# Patient Record
Sex: MALE | Race: AMERICAN INDIAN OR ALASKA NATIVE
[De-identification: names, ages, dates, MRNs, and addresses within clinical notes are randomized per-mention and may not be internally consistent; named-entity substitution may affect disease eponyms.]

---

## 2017-10-14 ENCOUNTER — HOSPITAL ENCOUNTER (EMERGENCY)
Dept: HOSPITAL 42 - ED | Age: 27
LOS: 1 days | Discharge: HOME | End: 2017-10-15
Payer: COMMERCIAL

## 2017-10-14 VITALS — TEMPERATURE: 99.9 F

## 2017-10-14 DIAGNOSIS — K52.9: Primary | ICD-10-CM

## 2017-10-14 DIAGNOSIS — Z87.891: ICD-10-CM

## 2017-10-14 DIAGNOSIS — J11.1: ICD-10-CM

## 2017-10-14 LAB
ALBUMIN/GLOB SERPL: 1.2 {RATIO} (ref 1.1–1.8)
ALP SERPL-CCNC: 57 U/L (ref 38–126)
ALT SERPL-CCNC: 22 U/L (ref 7–56)
APPEARANCE UR: (no result)
AST SERPL-CCNC: 21 U/L (ref 17–59)
BACTERIA #/AREA URNS HPF: (no result) /[HPF]
BASE EXCESS BLDV CALC-SCNC: 0.3 MMOL/L (ref 0–2)
BASOPHILS # BLD AUTO: 0.01 K/MM3 (ref 0–2)
BASOPHILS NFR BLD: 0.2 % (ref 0–3)
BILIRUB SERPL-MCNC: 0.5 MG/DL (ref 0.2–1.3)
BILIRUB UR-MCNC: NEGATIVE MG/DL
BUN SERPL-MCNC: 11 MG/DL (ref 7–21)
CALCIUM SERPL-MCNC: 9.5 MG/DL (ref 8.4–10.5)
CHLORIDE SERPL-SCNC: 101 MMOL/L (ref 98–107)
CO2 SERPL-SCNC: 26 MMOL/L (ref 21–33)
COLOR UR: YELLOW
EOSINOPHIL # BLD: 0 10*3/UL (ref 0–0.7)
EOSINOPHIL NFR BLD: 0 % (ref 1.5–5)
ERYTHROCYTE [DISTWIDTH] IN BLOOD BY AUTOMATED COUNT: 11.9 % (ref 11.5–14.5)
GLOBULIN SER-MCNC: 3.8 GM/DL
GLUCOSE SERPL-MCNC: 105 MG/DL (ref 70–110)
GLUCOSE UR STRIP-MCNC: NEGATIVE MG/DL
GRANULOCYTES # BLD: 3.08 10*3/UL (ref 1.4–6.5)
GRANULOCYTES NFR BLD: 67 % (ref 50–68)
HCT VFR BLD CALC: 40.8 % (ref 42–52)
KETONES UR STRIP-MCNC: NEGATIVE MG/DL
LEUKOCYTE ESTERASE UR-ACNC: NEGATIVE LEU/UL
LYMPHOCYTES # BLD: 0.8 10*3/UL (ref 1.2–3.4)
LYMPHOCYTES NFR BLD AUTO: 16.7 % (ref 22–35)
MAGNESIUM SERPL-MCNC: 1.5 MG/DL (ref 1.7–2.2)
MCH RBC QN AUTO: 27.9 PG (ref 25–35)
MCHC RBC AUTO-ENTMCNC: 33.8 G/DL (ref 31–37)
MCV RBC AUTO: 82.4 FL (ref 80–105)
MONOCYTES # BLD AUTO: 0.7 10*3/UL (ref 0.1–0.6)
MONOCYTES NFR BLD: 16.1 % (ref 1–6)
PH BLDV: 7.37 [PH] (ref 7.32–7.43)
PH UR STRIP: 6 [PH] (ref 4.7–8)
PHOSPHATE SERPL-MCNC: 2.1 MG/DL (ref 2.5–4.5)
PLATELET # BLD: 208 10^3/UL (ref 120–450)
PMV BLD AUTO: 9.3 FL (ref 7–11)
POTASSIUM SERPL-SCNC: 3.6 MMOL/L (ref 3.6–5)
PROT SERPL-MCNC: 8.3 G/DL (ref 5.8–8.3)
PROT UR STRIP-MCNC: 100 MG/DL
RBC # UR STRIP: NEGATIVE /UL
RBC #/AREA URNS HPF: NEGATIVE /HPF (ref 0–2)
SODIUM SERPL-SCNC: 139 MMOL/L (ref 132–148)
SP GR UR STRIP: >= 1.03 (ref 1–1.03)
UROBILINOGEN UR STRIP-ACNC: 0.2 E.U./DL
WBC # BLD AUTO: 4.6 10^3/UL (ref 4.5–11)

## 2017-10-14 PROCEDURE — 99284 EMERGENCY DEPT VISIT MOD MDM: CPT

## 2017-10-14 PROCEDURE — 85025 COMPLETE CBC W/AUTO DIFF WBC: CPT

## 2017-10-14 PROCEDURE — 80053 COMPREHEN METABOLIC PANEL: CPT

## 2017-10-14 PROCEDURE — 82803 BLOOD GASES ANY COMBINATION: CPT

## 2017-10-14 PROCEDURE — 71010: CPT

## 2017-10-14 PROCEDURE — 83735 ASSAY OF MAGNESIUM: CPT

## 2017-10-14 PROCEDURE — 84100 ASSAY OF PHOSPHORUS: CPT

## 2017-10-14 PROCEDURE — 87040 BLOOD CULTURE FOR BACTERIA: CPT

## 2017-10-14 PROCEDURE — 87086 URINE CULTURE/COLONY COUNT: CPT

## 2017-10-14 PROCEDURE — 96361 HYDRATE IV INFUSION ADD-ON: CPT

## 2017-10-14 PROCEDURE — 96374 THER/PROPH/DIAG INJ IV PUSH: CPT

## 2017-10-14 PROCEDURE — 93005 ELECTROCARDIOGRAM TRACING: CPT

## 2017-10-14 PROCEDURE — 74176 CT ABD & PELVIS W/O CONTRAST: CPT

## 2017-10-14 PROCEDURE — 81001 URINALYSIS AUTO W/SCOPE: CPT

## 2017-10-14 PROCEDURE — 96375 TX/PRO/DX INJ NEW DRUG ADDON: CPT

## 2017-10-14 NOTE — ED PDOC
Arrival/HPI





- General


Historian: Patient





- History of Present Illness


Time/Duration: > week


Symptom Onset: Gradual


Symptom Course: Unchanged


Quality: Cramping


Severity Level: Moderate


Activities at Onset: Rest


Context: Home





- General


Chief Complaint: GI Problem


Time Seen by Provider: 10/14/17 19:24





- History of Present Illness


Narrative History of Present Illness (Text): 





10/14/17 19:48


28yo M PH HIV (non-compliant with meds, hasn't taken anti-virals in years) 

presenting with fevers/chills, n/v/d, headaches and rhinorrhea x8days. pt 

states that vomiting started today, and it is non-bilious and non-bloody. 

abdominal pain is described as being diffuse. pt states he has not been taking 

his anti-virals and not f/u @ [facility] because he didn't like the meds 

he was put on; pt does not know the state of his HIV condition, CD4 or viral 

count. pt denies sick contacts, recent travel, chest pain, sob, weakness. pt 

denies tobacco and ETOH use but does admit to marijuana use. pt is homosexual 

and partner is bedside. 





 (Vishal Pedraza)





Past Medical History





- Provider Review


Nursing Documentation Reviewed: Yes





- Past History


Past History: Non-Contributing





- Cardiac


Hx Cardiac Disorders: No





- Pulmonary


Hx Respiratory Disorders: No





- Neurological


Hx Neurological Disorder: No





- HEENT


Hx HEENT Disorder: No





- Renal


Hx Renal Disorder: No





- Endocrine/Metabolic


Hx Endocrine Disorders: No





- Hematological/Oncological


Hx Blood Disorders: Yes


Hx HIV: Yes





- Integumentary


Hx Dermatological Disorder: No





- Musculoskeletal/Rheumatological


Hx Musculoskeletal Disorders: No





- Gastrointestinal


Hx Gastrointestinal Disorders: No





- Genitourinary/Gynecological


Hx Genitourinary Disorders: No





- Psychiatric


Hx Psychophysiologic Disorder: No


Hx Substance Use: Yes (CANNABIS)





- Past Surgical History


Past Surgical History: No Previous





Family/Social History





- Physician Review


Nursing Documentation Reviewed: Yes


Family/Social History: No Known Family HX


Smoking Status: Former Smoker


Hx Alcohol Use: Yes


Frequency of alcohol use: Socially


Hx Substance Use: Yes (CANNABIS)





Allergies/Home Meds


Allergies/Adverse Reactions: 


Allergies





No Known Allergies Allergy (Verified 10/14/17 19:23)


 











Review of Systems





- Physician Review


All systems were reviewed & negative as marked: Yes





- Review of Systems


Constitutional: Fevers, Night Sweats


Respiratory: absent: SOB, Cough, Wheezing


Cardiovascular: absent: Chest Pain, Palpitations


Gastrointestinal: Abdominal Pain, Diarrhea, Nausea, Vomiting.  absent: 

Hematochezia, Hematemesis


Musculoskeletal: Myalgias


Neurological: Headache





Physical Exam


Vital Signs Reviewed: Yes


Appearance: Positive for: Well-Appearing


Pain Distress: None


Mental Status: Positive for: Alert and Oriented X 3





- Systems Exam


Head: Present: Atraumatic, Normocephalic


Pupils: Present: PERRL


Extroacular Muscles: Present: EOMI


Conjunctiva: Present: Normal


Mouth: Present: Moist Mucous Membranes


Respiratory/Chest: Present: Clear to Auscultation, Good Air Exchange.  No: 

Respiratory Distress, Accessory Muscle Use, Wheezes


Cardiovascular: Present: Normal S1, S2, Tachycardic


Abdomen: Present: Tenderness (moderate diffuse tenderness to palpation), Normal 

Bowel Sounds.  No: Distention, Peritoneal Signs, Rebound, Guarding


Back: Present: Normal Inspection.  No: CVA Tenderness


Upper Extremity: Present: Normal Inspection, Normal ROM


Lower Extremity: Present: Normal Inspection.  No: Edema


Neurological: Present: CN II-XII Intact, Speech Normal, Motor Func Grossly 

Intact, Normal Sensory Function


Skin: Present: Warm, Dry, Normal Color


Psychiatric: Present: Alert, Oriented x 3


Vital Signs











  Temp Pulse Resp BP Pulse Ox


 


 10/14/17 22:26   86  18  101/63  95


 


 10/14/17 19:24  99.9 F H  135 H  17  119/80  97














Medical Decision Making


Re-evaluation Time: 23:40


Reassessment Condition: Re-examined, Improved





- Lab Interpretations


I have reviewed the lab results: Yes





- RAD Interpretation


: Radiologist





- EKG Interpretation


Interpreted by ED Physician: Yes


Type: 12 lead EKG


ED Course and Treatment: 


Impression:


Pt seen and evaluated with medical resident. Pt, whose past medical history 

includes HIV,  presented for nausea, vomiting, diarrhea, rhinorrhea, and 

headaches for 8  days. Aware and agree with HPI, clinical findings, plan, and 

management.





Plan:


-- CT Abdomen and Pelvis


-- CXR


-- Labs, VBG, blood cultures


-- Urinalysis, urine cultures


-- IV fluids


-- Zofran


-- Tylenol


-- Reassess and disposition








 (Russel Disla)





10/14/17 19:58


Impression:


28yo M HIV+ presenting with flu symptoms x8 days





Plan:


- Labs


- Blood and Urine Cxs


- VBG shock panel


- CXR


- Abdominal CT


- 1L NS bolus


- Zofran


- Tylenol


- Reassess and Dispo





Progress:





10/14/17 22:18


- Reassessment: pt complaining of epigastric discomfort/indigestion, but states 

improvement with fluids. PTX and IVF ordered. EKG ordered. 





CT Scan ABD/PELVIS W/O PO OR IV CONT


Report Date : 10/14/2017 23:10:00


Creator : CANDI JANSEN





 FINDINGS:  


   Limitations:  Lack of oral and intravenous contrast and paucity of body fat 

  


 limits evaluation of the abdomen and pelvis.  


   Lower thorax:  Heart size is normal. There is minimal scarring at the lung   


 bases. There is no focal consolidation  


   ABDOMEN:  


   Liver:  unremarkable  


   Gallbladder and bile ducts:  unremarkable  


   Pancreas:  unremarkable  


   Spleen:  unremarkable  


   Adrenals:  unremarkable  


   Kidneys and ureters: Kidneys are unremarkable. Ureters are difficult to   


 identify.  


   Stomach and bowel:  unremarkable  


   Appendix:  Stomach is empty. Rotation is normal. Small bowel is mildly   


 distended with fluid and air. Ileocecal region is poorly defined. Appendix   


 could not be identified. There is an air-fluid level in the cecum. There is a 

  


 trace amount of radiopaque material in the cecum.Colon is incompletely   


 distended which limits evaluation.   


   PELVIS:  


   Bladder:  unremarkable  


   Reproductive:  Seminal vesicles and prostate are unremarkable.  


   ABDOMEN and PELVIS:  


   Intraperitoneal space:  There is no definite free fluid in the pelvis.There 

  


 is no free air.  


   Bones/joints:  There are no acute osseous abnormalities.  


   Soft tissues:  See above.  


   Vasculature:  Aorta is normal in caliber.  


   Lymph nodes:  The paucity of fat and lack of contrast limits evaluation of   


 nodes.  


 


 IMPRESSION:  Limited by lack of oral and intravenous contrast and paucity of   


 body fat, no bowel obstruction, air-fluid levels in the colon consistent with 

  


 diarrheal illness; no acute solid visceral abnormality; no focal pneumonia  


 


CXR unremarkable, as read by me. 





Reassessment: pt feeling better, and denying complaints of n/v/d currently and 

denying fevers/chills. explained to pt about d/c instructions and proper f/u, 

pt understands. 





 (Vishal Pedraza)





- Lab Interpretations


Lab Results: 








 10/14/17 19:50 





 10/14/17 19:50 





 Lab Results





10/14/17 21:02: Urine Color Yellow, Urine Appearance Sl cloudy, Urine pH 6.0, 

Ur Specific Gravity >= 1.030, Urine Protein 100 H, Urine Glucose (UA) Negative, 

Urine Ketones Negative, Urine Blood Negative, Urine Nitrate Negative, Urine 

Bilirubin Negative, Urine Urobilinogen 0.2, Ur Leukocyte Esterase Negative, 

Urine RBC Negative, Urine WBC 5 - 10, Ur Epithelial Cells 4 - 5, Urine Bacteria 

Few, Coarse Granular Casts Trace H


10/14/17 19:50: Sodium 139, Chloride 101, Potassium 3.6, Carbon Dioxide 26, 

Anion Gap 16, BUN 11, Creatinine 1.0, Est GFR (African Amer) > 60, Est GFR (Non-

Af Amer) > 60, Random Glucose 105, Calcium 9.5, Phosphorus 2.1 L, Magnesium 1.5 

L, Total Bilirubin 0.5, AST 21, ALT 22, Alkaline Phosphatase 57, Total Protein 

8.3, Albumin 4.5, Globulin 3.8, Albumin/Globulin Ratio 1.2


10/14/17 19:50: pO2 47, VBG pH 7.37, VBG pCO2 45.0, VBG HCO3 26.0, VBG Total 

CO2 27.4, VBG O2 Sat (Calc) 86.8 H, VBG Base Excess 0.3, VBG Potassium 3.4 L, 

Sodium 135.0, Chloride 102.0, Glucose 103, Lactate 1.7, FiO2 21.0, Venous Blood 

Potassium 3.4 L


10/14/17 19:50: WBC 4.6, RBC 4.95, Hgb 13.8 L, Hct 40.8 L, MCV 82.4, MCH 27.9, 

MCHC 33.8, RDW 11.9, Plt Count 208, MPV 9.3, Gran % 67.0, Lymph % (Auto) 16.7 L

, Mono % (Auto) 16.1 H, Eos % (Auto) 0.0 L, Baso % (Auto) 0.2, Gran # 3.08, 

Lymph # 0.8 L, Mono # 0.7 H, Eos # 0.0, Baso # 0.01











- RAD Interpretation


Radiology Orders: 








10/14/17 19:46


ABD & PELVIS W/O PO OR IV CONT [CT] Stat 


CHEST PORTABLE [RAD] Stat 














- Medication Orders


Current Medication Orders: 











Discontinued Medications





Acetaminophen (Tylenol 325mg Tab)  650 mg PO STAT STA


   Stop: 10/14/17 20:05


   Last Admin: 10/14/17 20:09  Dose: 650 mg





MAR Pain/Vitals


 Document     10/14/17 20:09  GMD  (Rec: 10/14/17 20:09  GMD  [id number])


     Pain Reassessment


      Is This A Pain ReAssessment?               No


     Sleep


      Is patient sleeping during reassessment?   No


     Presence of Pain


      Presence of Pain                           Yes





Sodium Chloride (Sodium Chloride 0.9%)  1,000 mls @ 999 mls/hr IV .Q1H1M STA


   Stop: 10/14/17 20:46


   Last Admin: 10/14/17 20:08  Dose: 999 mls/hr





eMAR Start Stop


 Document     10/14/17 20:08  GMD  (Rec: 10/14/17 20:08  GMD  [id number])


     Intravenous Solution


      Start Date                                 10/14/17


      Start Time                                 20:08


      End Date                                   10/14/17


      End time                                   21:09


      Total Infusion Time                        61





Sodium Chloride (Sodium Chloride 0.9%)  1,000 mls @ 999 mls/hr IV .Q1H1M STA


   Stop: 10/14/17 23:17


   Last Admin: 10/14/17 22:32  Dose: 999 mls/hr





eMAR Start Stop


 Document     10/14/17 22:32  GMD  (Rec: 10/14/17 22:32  GMD  [id number])


     Intravenous Solution


      Start Date                                 10/14/17


      Start Time                                 22:32


      End Date                                   10/14/17


      End time                                   23:33


      Total Infusion Time                        61





Ondansetron HCl (Zofran Inj)  4 mg IVP STAT STA


   Stop: 10/14/17 19:47


   Last Admin: 10/14/17 20:09  Dose: 4 mg





IVP Administration


 Document     10/14/17 20:09  GMD  (Rec: 10/14/17 20:09  GMD  [id number])


     Charges for Administration


      # of IVP Administrations                   1





Pantoprazole Sodium (Protonix Inj)  40 mg IVP STAT STA


   Stop: 10/14/17 22:17


   Last Admin: 10/14/17 22:32  Dose: 40 mg





IVP Administration


 Document     10/14/17 22:32  GMD  (Rec: 10/14/17 22:32  GMD  [id number])


     Charges for Administration


      # of IVP Administrations                   1














Disposition/Present on Arrival





- Present on Arrival


Any Indicators Present on Arrival: No


History of DVT/PE: No


History of Uncontrolled Diabetes: No


Urinary Catheter: No


History of Decub. Ulcer: No


History Surgical Site Infection Following: None





- Disposition


Have Diagnosis and Disposition been Completed?: Yes


Disposition Time: 23:42


Patient Plan: Discharge





- Disposition


Diagnosis: 


 Flu-like symptoms, Nausea & vomiting, Diarrhea, Gastroenteritis





Disposition: HOME/ ROUTINE


Condition: GOOD


Discharge Instructions (ExitCare):  Gastroenteritis (ED)


Additional Instructions: 


- please take the meds as prescribed


- please f/u at either [facility] or with ID MD (Dr. Mcnamara) for you HIV history


- if you experiencing worsening of fevers/chills, nausea/vomiting or diarrhea 

please return to ER for further workup


Prescriptions: 


metroNIDAZOLE [Flagyl] 500 mg PO Q8H #21 tab


Ondansetron ODT [Zofran ODT] 8 mg PO TID #12 odt


Referrals: 


PCP,NO [Primary Care Provider] - Follow up with primary


Melquiades Mcnamara MD [Staff Provider] - Follow up with primary


[facility] [place] up with primary


Forms:  [employer] (English)

## 2017-10-14 NOTE — CT
EXAM:

  CT Abdomen and Pelvis Without Intravenous Contrast



EXAM DATE/TIME:

  10/14/2017 7:46 PM



CLINICAL HISTORY:

  27 years old, male; Pain; Abdominal pain; Additional info: N/v/d x8 d



TECHNIQUE:

  Axial computed tomography images of the abdomen and pelvis without 

intravenous contrast.  All CT scans at this facility use one or more dose 

reduction techniques, viz.: automated exposure control; ma/kV adjustment per 

patient size (including targeted exams where dose is matched to indication; 

i.e. head); or iterative reconstruction technique.

  Coronal reformatted images were created and reviewed.



COMPARISON:

  There are no prior studies for comparison.



FINDINGS:

  Limitations:  Lack of oral and intravenous contrast and paucity of body fat 

limits evaluation of the abdomen and pelvis.

  Lower thorax:  Heart size is normal. There is minimal scarring at the lung 

bases. There is no focal consolidation



 ABDOMEN:

  Liver:  unremarkable

  Gallbladder and bile ducts:  unremarkable

  Pancreas:  unremarkable

  Spleen:  unremarkable

  Adrenals:  unremarkable

  Kidneys and ureters: Kidneys are unremarkable. Ureters are difficult to 

identify.

  Stomach and bowel:  unremarkable

  Appendix:  Stomach is empty. Rotation is normal. Small bowel is mildly 

distended with fluid and air. Ileocecal region is poorly defined. Appendix 

could not be identified. There is an air-fluid level in the cecum. There is a 

trace amount of radiopaque material in the cecum.Colon is incompletely 

distended which limits evaluation. 



 PELVIS:

  Bladder:  unremarkable

  Reproductive:  Seminal vesicles and prostate are unremarkable.



 ABDOMEN and PELVIS:

  Intraperitoneal space:  There is no definite free fluid in the pelvis.There 

is no free air.

  Bones/joints:  There are no acute osseous abnormalities.

  Soft tissues:  See above.

  Vasculature:  Aorta is normal in caliber.

  Lymph nodes:  The paucity of fat and lack of contrast limits evaluation of 

nodes.



IMPRESSION:  Limited by lack of oral and intravenous contrast and paucity of 

body fat, no bowel obstruction, air-fluid levels in the colon consistent with 

diarrheal illness; no acute solid visceral abnormality; no focal pneumonia

## 2017-10-15 VITALS
DIASTOLIC BLOOD PRESSURE: 68 MMHG | HEART RATE: 78 BPM | RESPIRATION RATE: 16 BRPM | SYSTOLIC BLOOD PRESSURE: 100 MMHG | OXYGEN SATURATION: 99 %

## 2017-10-15 NOTE — RAD
HISTORY:

HIV+ p/w cough and fevers  



COMPARISON:

No prior. 



FINDINGS:



LUNGS:

No active pulmonary disease.



PLEURA:

No significant pleural effusion iden low tified, no pneumothorax 

apparent.



CARDIOVASCULAR:

Normal.



OSSEOUS STRUCTURES:

No significant abnormalities.



VISUALIZED UPPER ABDOMEN:

Normal.



OTHER FINDINGS:

None.



IMPRESSION:

No active disease.

## 2017-10-16 NOTE — CARD
--------------- APPROVED REPORT --------------





EKG Measurement

Heart [id number]

WI [id number]

[id number]

[id number]

[id number]



<Conclusion>

Normal sinus rhythm with sinus arrhythmia

Normal ECG

## 2018-04-06 ENCOUNTER — HOSPITAL ENCOUNTER (EMERGENCY)
Dept: HOSPITAL 42 - ED | Age: 28
Discharge: HOME | End: 2018-04-06
Payer: COMMERCIAL

## 2018-04-06 VITALS — RESPIRATION RATE: 18 BRPM

## 2018-04-06 VITALS
HEART RATE: 90 BPM | SYSTOLIC BLOOD PRESSURE: 130 MMHG | DIASTOLIC BLOOD PRESSURE: 68 MMHG | OXYGEN SATURATION: 99 % | TEMPERATURE: 98.5 F

## 2018-04-06 VITALS — BODY MASS INDEX: 20.8 KG/M2

## 2018-04-06 DIAGNOSIS — Z51.89: Primary | ICD-10-CM

## 2018-04-06 NOTE — ED PDOC
Arrival/HPI





- General


Time Seen by Provider: 04/06/18 21:32


Historian: Patient





- History of Present Illness


Narrative History of Present Illness (Text): 





04/06/18 21:32


28 year old male, no significant pmh, nkda, complaining of  wound check s/p I&D 

abscess on the rt. gluteal and lt. hip region yesterday at [facility] which 

he has scheduled appointment for the dressing change tomorrow.  Pt. is 

currently on the keflex/bactrimds, stated that the packing fall out today from 

the incision wound, no fever or chills, pain on the wound is much less, no 

night sweat, no dizziness, no change in vision, no palpitation, no other 

medical or psychological complaints. 








Past Medical History





- Provider Review


Nursing Documentation Reviewed: Yes





- Past History


Past History: Non-Contributing





- Cardiac


Hx Cardiac Disorders: No





- Pulmonary


Hx Respiratory Disorders: No





- Neurological


Hx Neurological Disorder: No





- HEENT


Hx HEENT Disorder: No





- Renal


Hx Renal Disorder: No





- Endocrine/Metabolic


Hx Endocrine Disorders: No





- Hematological/Oncological


Hx Blood Disorders: Yes


Hx HIV: Yes





- Integumentary


Hx Dermatological Disorder: No





- Musculoskeletal/Rheumatological


Hx Musculoskeletal Disorders: No





- Gastrointestinal


Hx Gastrointestinal Disorders: No





- Genitourinary/Gynecological


Hx Genitourinary Disorders: No





- Psychiatric


Hx Psychophysiologic Disorder: No


Hx Substance Use: Yes (CANNABIS)





- Past Surgical History


Past Surgical History: No Previous





Family/Social History





- Physician Review


Nursing Documentation Reviewed: Yes


Family/Social History: Unknown Family HX


Smoking Status: Former Smoker


Hx Alcohol Use: Yes


Hx Substance Use: Yes (CANNABIS)





Allergies/Home Meds


Allergies/Adverse Reactions: 


Allergies





No Known Allergies Allergy (Verified 04/06/18 21:47)


 











Review of Systems





- Review of Systems


Constitutional: absent: Fatigue, Fevers


Eyes: absent: Vision Changes


ENT: absent: Hearing Changes


Respiratory: absent: SOB, Cough


Cardiovascular: absent: Chest Pain


Gastrointestinal: absent: Abdominal Pain, Nausea, Vomiting


Skin: Abscess.  absent: Rash, Pruritis


Neurological: absent: Headache, Dizziness


Psychiatric: absent: Anxiety, Depression, Suicidal Ideation





Physical Exam





- Systems Exam


Head: Present: Atraumatic, Normocephalic


Pupils: Present: PERRL


Extroacular Muscles: Present: EOMI


Conjunctiva: Present: Normal


Mouth: Present: Moist Mucous Membranes


Neck: Present: Normal Range of Motion


Respiratory/Chest: Present: Clear to Auscultation, Good Air Exchange.  No: 

Respiratory Distress, Accessory Muscle Use


Cardiovascular: Present: Regular Rate and Rhythm, Normal S1, S2.  No: Murmurs


Abdomen: No: Tenderness, Distention, Peritoneal Signs


Back: Present: Normal Inspection


Upper Extremity: Present: Normal Inspection.  No: Cyanosis, Edema


Lower Extremity: Present: Normal Inspection.  No: Edema


Neurological: Present: GCS=15, Speech Normal, Motor Func Grossly Intact, Gait 

Normal, Memory Normal


Skin: Present: Warm, Dry, Rashes (Lt. hip region visible I&D wound approx. 1cm 

wound in total with no surround cellulitis or streaking with no packing noted, 

rt. gluteal buttock region visible approx. 1cm diameter with no packing noted, 

no cellulitis or streaking, no ulcers. ), Normal Color


Psychiatric: Present: Alert, Oriented x 3, Normal Insight, Normal Concentration





Medical Decision Making


ED Course and Treatment: 





04/06/18 21:52


-Wound healing well and dry, clean with betadine and saline, pt. refused 

repacking with risk and benefits explained, advised to see the surgeon tomorrow 

for wound re-evaluation, new dressing applied under sterile technique.


-Discharge home with education on continue the antibiotic at home, see your 

specialist for the wound check and dressing change tomorrow, return to the Er 

for any new or worsening signs or symptoms. 





- PA / NP / Resident Statement


MD/ has reviewed & agrees with the documentation as recorded.





Disposition/Present on Arrival





- Present on Arrival


Any Indicators Present on Arrival: No


History of DVT/PE: No


History of Uncontrolled Diabetes: No


Urinary Catheter: No


History of Decub. Ulcer: No


History Surgical Site Infection Following: None





- Disposition


Have Diagnosis and Disposition been Completed?: Yes


Diagnosis: 


 Abscess, Visit for wound check





Disposition: HOME/ ROUTINE


Disposition Time: 21:54


Patient Plan: Transfer To


Condition: GOOD


Additional Instructions: 


-Discharge home with education on continue the antibiotic at home, see your 

specialist for the wound check and dressing change tomorrow, return to the Er 

for any new or worsening signs or symptoms. 


Referrals: 


PCP,NO [Primary Care Provider] - Follow up with primary


Fermín Mccrary MD [Staff Provider] - Follow up with primary


[facility] [place] Follow up with primary


Forms:  WORK NOTE

## 2019-01-22 ENCOUNTER — HOSPITAL ENCOUNTER (EMERGENCY)
Dept: HOSPITAL 42 - ED | Age: 29
Discharge: HOME | End: 2019-01-22
Payer: COMMERCIAL

## 2019-01-22 VITALS
SYSTOLIC BLOOD PRESSURE: 112 MMHG | OXYGEN SATURATION: 97 % | DIASTOLIC BLOOD PRESSURE: 69 MMHG | HEART RATE: 73 BPM | TEMPERATURE: 98.2 F | RESPIRATION RATE: 18 BRPM

## 2019-01-22 VITALS — BODY MASS INDEX: 20.8 KG/M2

## 2019-01-22 DIAGNOSIS — B00.2: Primary | ICD-10-CM

## 2019-01-22 NOTE — ED PDOC
Arrival/HPI





- General


Chief Complaint: Abnormal Skin Integrity


Time Seen by Provider: 01/22/19 14:33


Historian: Patient, Other (partner)





- History of Present Illness


Time/Duration: 1 week


Symptom Onset: Gradual


Symptom Course: Worsening


Severity Level: Moderate


Activities at Onset: Rest


Associated Symptoms (Text): 





01/22/19 14:41


Patient complains of a breakout of cold sores over both his upper and lower lip 

bilaterally. Partner reports that he has this approximately once a year, and 

they are usually able to control it with Abreva, but were unable to do so with 

this episode. I question the patient about his HIV status, and he reports that 

he is HIV positive. He last saw a physician approximately 2 years ago. He had 

been on antivirals, but stopped taking them in favor of trying a diet. He denies

any fever. He just got insurance and is going to see a doctor tomorrow. I 

discussed with the patient and his partner that he will need a workup by his PMD

including blood count and viral load. They are agreeable to follow-up with PMD. 

In the meantime we will treat his herpes outbreak with antivirals.





Past Medical History





- Past History


Past History: Non-Contributing





- Cardiac


Hx Cardiac Disorders: No





- Pulmonary


Hx Respiratory Disorders: No





- Neurological


Hx Neurological Disorder: No





- HEENT


Hx HEENT Disorder: No





- Renal


Hx Renal Disorder: No





- Endocrine/Metabolic


Hx Endocrine Disorders: No





- Hematological/Oncological


Hx Blood Disorders: Yes





- Integumentary


Hx Dermatological Disorder: No





- Musculoskeletal/Rheumatological


Hx Musculoskeletal Disorders: No





- Gastrointestinal


Hx Gastrointestinal Disorders: No





- Genitourinary/Gynecological


Hx Genitourinary Disorders: No





- Psychiatric


Hx Psychophysiologic Disorder: No


Hx Substance Use: Yes (CANNABIS)





- Past Surgical History


Past Surgical History: No Previous





- Anesthesia


Hx Anesthesia: Yes


Hx Anesthesia Reactions: No


Hx Malignant Hyperthermia: No





Family/Social History





- Physician Review


Nursing Documentation Reviewed: Yes


Family/Social History: Unknown Family HX


Smoking Status: Former Smoker


Hx Alcohol Use: Yes


Hx Substance Use: Yes (CANNABIS)


Substance used: daily





Allergies/Home Meds


Allergies/Adverse Reactions: 


Allergies





No Known Allergies Allergy (Verified 04/06/18 21:47)


   











Review of Systems





- Physician Review


All systems were reviewed & negative as marked: Yes





- Review of Systems


Constitutional: absent: Fatigue, Fevers


Respiratory: absent: SOB, Cough


Gastrointestinal: absent: Abdominal Pain, Diarrhea, Nausea, Vomiting


Skin: Rash


Neurological: absent: Headache, Dizziness





Physical Exam





Vital Signs











  Temp Pulse Resp BP Pulse Ox


 


 01/22/19 13:11  98.2 F  73  18  112/69  97











Temperature: Afebrile


Blood Pressure: Normal


Pulse: Regular


Respiratory Rate: Normal


Appearance: Positive for: Well-Appearing, Non-Toxic, Comfortable


Pain Distress: None


Mental Status: Positive for: Alert and Oriented X 3





- Systems Exam


Pupils: Present: PERRL


Extroacular Muscles: Present: EOMI


Conjunctiva: Present: Normal


Mouth: Present: Moist Mucous Membranes.  No: Normal Lips (Bilateral upper and 

lower lips herpes simplex)


Pharnyx: No: ERYTHEMA, EXUDATE, TONSILS ENLARGED


Skin: Present: Warm, Dry, Normal Color.  No: Rashes





Disposition/Present on Arrival





- Present on Arrival


Any Indicators Present on Arrival: No


History of DVT/PE: No


History of Uncontrolled Diabetes: No


Urinary Catheter: No


History of Decub. Ulcer: No


History Surgical Site Infection Following: None





- Disposition


Have Diagnosis and Disposition been Completed?: Yes


Diagnosis: 


 Herpes gingivostomatitis





Disposition: HOME/ ROUTINE


Disposition Time: 14:45


Patient Plan: Discharge


Condition: GOOD


Discharge Instructions (ExitCare):  Cold Sores (Oral Herpes) (DC)


Prescriptions: 


Famciclovir [Famvir] 500 mg PO Q8 #21 tab


Referrals: 


Astrid Washburn MD [Primary Care Provider] - Follow up with primary


Forms:  [employer] (English)